# Patient Record
Sex: MALE | Race: WHITE | Employment: FULL TIME | URBAN - METROPOLITAN AREA
[De-identification: names, ages, dates, MRNs, and addresses within clinical notes are randomized per-mention and may not be internally consistent; named-entity substitution may affect disease eponyms.]

---

## 2019-05-01 ENCOUNTER — APPOINTMENT (OUTPATIENT)
Dept: CT IMAGING | Age: 59
End: 2019-05-01
Payer: COMMERCIAL

## 2019-05-01 ENCOUNTER — HOSPITAL ENCOUNTER (OUTPATIENT)
Age: 59
Setting detail: OBSERVATION
Discharge: HOME | End: 2019-05-03
Attending: EMERGENCY MEDICINE | Admitting: INTERNAL MEDICINE
Payer: COMMERCIAL

## 2019-05-01 DIAGNOSIS — K85.90 ACUTE HEMORRHAGIC PANCREATITIS: Primary | ICD-10-CM

## 2019-05-01 PROBLEM — K85.00 IDIOPATHIC ACUTE PANCREATITIS: Status: ACTIVE | Noted: 2019-05-01

## 2019-05-01 LAB
ALBUMIN SERPL-MCNC: 4.1 G/DL (ref 3.5–5.2)
ALP BLD-CCNC: 44 U/L (ref 40–129)
ALT SERPL-CCNC: 21 U/L (ref 0–40)
ANION GAP SERPL CALCULATED.3IONS-SCNC: 13 MMOL/L (ref 7–16)
AST SERPL-CCNC: 17 U/L (ref 0–39)
BASOPHILS ABSOLUTE: 0.07 E9/L (ref 0–0.2)
BASOPHILS RELATIVE PERCENT: 0.5 % (ref 0–2)
BILIRUB SERPL-MCNC: 0.4 MG/DL (ref 0–1.2)
BUN BLDV-MCNC: 13 MG/DL (ref 6–20)
CALCIUM SERPL-MCNC: 9.2 MG/DL (ref 8.6–10.2)
CHLORIDE BLD-SCNC: 103 MMOL/L (ref 98–107)
CO2: 25 MMOL/L (ref 22–29)
CREAT SERPL-MCNC: 1.1 MG/DL (ref 0.7–1.2)
EOSINOPHILS ABSOLUTE: 0.02 E9/L (ref 0.05–0.5)
EOSINOPHILS RELATIVE PERCENT: 0.1 % (ref 0–6)
GFR AFRICAN AMERICAN: >60
GFR NON-AFRICAN AMERICAN: >60 ML/MIN/1.73
GLUCOSE BLD-MCNC: 136 MG/DL (ref 74–99)
HCT VFR BLD CALC: 50.1 % (ref 37–54)
HEMOGLOBIN: 17.1 G/DL (ref 12.5–16.5)
IMMATURE GRANULOCYTES #: 0.07 E9/L
IMMATURE GRANULOCYTES %: 0.5 % (ref 0–5)
LACTIC ACID: 2 MMOL/L (ref 0.5–2.2)
LIPASE: 20 U/L (ref 13–60)
LYMPHOCYTES ABSOLUTE: 1.3 E9/L (ref 1.5–4)
LYMPHOCYTES RELATIVE PERCENT: 9.6 % (ref 20–42)
MCH RBC QN AUTO: 31.1 PG (ref 26–35)
MCHC RBC AUTO-ENTMCNC: 34.1 % (ref 32–34.5)
MCV RBC AUTO: 91.3 FL (ref 80–99.9)
MONOCYTES ABSOLUTE: 0.74 E9/L (ref 0.1–0.95)
MONOCYTES RELATIVE PERCENT: 5.4 % (ref 2–12)
NEUTROPHILS ABSOLUTE: 11.41 E9/L (ref 1.8–7.3)
NEUTROPHILS RELATIVE PERCENT: 83.9 % (ref 43–80)
PDW BLD-RTO: 13.2 FL (ref 11.5–15)
PLATELET # BLD: 172 E9/L (ref 130–450)
PMV BLD AUTO: 10.6 FL (ref 7–12)
POTASSIUM SERPL-SCNC: 4.4 MMOL/L (ref 3.5–5)
RBC # BLD: 5.49 E12/L (ref 3.8–5.8)
SODIUM BLD-SCNC: 141 MMOL/L (ref 132–146)
TOTAL PROTEIN: 6.4 G/DL (ref 6.4–8.3)
WBC # BLD: 13.6 E9/L (ref 4.5–11.5)

## 2019-05-01 PROCEDURE — 36415 COLL VENOUS BLD VENIPUNCTURE: CPT

## 2019-05-01 PROCEDURE — 96376 TX/PRO/DX INJ SAME DRUG ADON: CPT

## 2019-05-01 PROCEDURE — 96361 HYDRATE IV INFUSION ADD-ON: CPT

## 2019-05-01 PROCEDURE — 85018 HEMOGLOBIN: CPT

## 2019-05-01 PROCEDURE — G0378 HOSPITAL OBSERVATION PER HR: HCPCS

## 2019-05-01 PROCEDURE — 74177 CT ABD & PELVIS W/CONTRAST: CPT

## 2019-05-01 PROCEDURE — 80053 COMPREHEN METABOLIC PANEL: CPT

## 2019-05-01 PROCEDURE — 2580000003 HC RX 258: Performed by: EMERGENCY MEDICINE

## 2019-05-01 PROCEDURE — 6360000002 HC RX W HCPCS: Performed by: EMERGENCY MEDICINE

## 2019-05-01 PROCEDURE — 2580000003 HC RX 258: Performed by: RADIOLOGY

## 2019-05-01 PROCEDURE — 85014 HEMATOCRIT: CPT

## 2019-05-01 PROCEDURE — 6360000004 HC RX CONTRAST MEDICATION: Performed by: RADIOLOGY

## 2019-05-01 PROCEDURE — 6360000002 HC RX W HCPCS: Performed by: INTERNAL MEDICINE

## 2019-05-01 PROCEDURE — C9113 INJ PANTOPRAZOLE SODIUM, VIA: HCPCS | Performed by: EMERGENCY MEDICINE

## 2019-05-01 PROCEDURE — 2580000003 HC RX 258: Performed by: INTERNAL MEDICINE

## 2019-05-01 PROCEDURE — 85025 COMPLETE CBC W/AUTO DIFF WBC: CPT

## 2019-05-01 PROCEDURE — 83690 ASSAY OF LIPASE: CPT

## 2019-05-01 PROCEDURE — 96374 THER/PROPH/DIAG INJ IV PUSH: CPT

## 2019-05-01 PROCEDURE — 96375 TX/PRO/DX INJ NEW DRUG ADDON: CPT

## 2019-05-01 PROCEDURE — 99285 EMERGENCY DEPT VISIT HI MDM: CPT

## 2019-05-01 PROCEDURE — 83605 ASSAY OF LACTIC ACID: CPT

## 2019-05-01 RX ORDER — SODIUM CHLORIDE 9 MG/ML
INJECTION, SOLUTION INTRAVENOUS CONTINUOUS
Status: DISCONTINUED | OUTPATIENT
Start: 2019-05-01 | End: 2019-05-02

## 2019-05-01 RX ORDER — MORPHINE SULFATE 2 MG/ML
2 INJECTION, SOLUTION INTRAMUSCULAR; INTRAVENOUS
Status: DISCONTINUED | OUTPATIENT
Start: 2019-05-01 | End: 2019-05-03 | Stop reason: HOSPADM

## 2019-05-01 RX ORDER — PANTOPRAZOLE SODIUM 40 MG/10ML
40 INJECTION, POWDER, LYOPHILIZED, FOR SOLUTION INTRAVENOUS ONCE
Status: COMPLETED | OUTPATIENT
Start: 2019-05-01 | End: 2019-05-01

## 2019-05-01 RX ORDER — SODIUM CHLORIDE 0.9 % (FLUSH) 0.9 %
10 SYRINGE (ML) INJECTION PRN
Status: COMPLETED | OUTPATIENT
Start: 2019-05-01 | End: 2019-05-01

## 2019-05-01 RX ORDER — 0.9 % SODIUM CHLORIDE 0.9 %
1000 INTRAVENOUS SOLUTION INTRAVENOUS ONCE
Status: COMPLETED | OUTPATIENT
Start: 2019-05-01 | End: 2019-05-01

## 2019-05-01 RX ORDER — POTASSIUM CHLORIDE 20 MEQ/1
40 TABLET, EXTENDED RELEASE ORAL PRN
Status: DISCONTINUED | OUTPATIENT
Start: 2019-05-01 | End: 2019-05-03 | Stop reason: HOSPADM

## 2019-05-01 RX ORDER — 0.9 % SODIUM CHLORIDE 0.9 %
1000 INTRAVENOUS SOLUTION INTRAVENOUS ONCE
Status: DISCONTINUED | OUTPATIENT
Start: 2019-05-01 | End: 2019-05-01

## 2019-05-01 RX ORDER — ONDANSETRON 2 MG/ML
4 INJECTION INTRAMUSCULAR; INTRAVENOUS EVERY 6 HOURS PRN
Status: DISCONTINUED | OUTPATIENT
Start: 2019-05-01 | End: 2019-05-03 | Stop reason: HOSPADM

## 2019-05-01 RX ORDER — SODIUM CHLORIDE 0.9 % (FLUSH) 0.9 %
10 SYRINGE (ML) INJECTION EVERY 12 HOURS SCHEDULED
Status: DISCONTINUED | OUTPATIENT
Start: 2019-05-01 | End: 2019-05-02 | Stop reason: SDUPTHER

## 2019-05-01 RX ORDER — SODIUM CHLORIDE 0.9 % (FLUSH) 0.9 %
10 SYRINGE (ML) INJECTION PRN
Status: DISCONTINUED | OUTPATIENT
Start: 2019-05-01 | End: 2019-05-03 | Stop reason: HOSPADM

## 2019-05-01 RX ORDER — ONDANSETRON 2 MG/ML
8 INJECTION INTRAMUSCULAR; INTRAVENOUS ONCE
Status: COMPLETED | OUTPATIENT
Start: 2019-05-01 | End: 2019-05-01

## 2019-05-01 RX ORDER — FENTANYL CITRATE 50 UG/ML
50 INJECTION, SOLUTION INTRAMUSCULAR; INTRAVENOUS ONCE
Status: COMPLETED | OUTPATIENT
Start: 2019-05-01 | End: 2019-05-01

## 2019-05-01 RX ORDER — POTASSIUM CHLORIDE 7.45 MG/ML
10 INJECTION INTRAVENOUS PRN
Status: DISCONTINUED | OUTPATIENT
Start: 2019-05-01 | End: 2019-05-03 | Stop reason: HOSPADM

## 2019-05-01 RX ADMIN — IOPAMIDOL 110 ML: 755 INJECTION, SOLUTION INTRAVENOUS at 18:07

## 2019-05-01 RX ADMIN — FENTANYL CITRATE 50 MCG: 50 INJECTION, SOLUTION INTRAMUSCULAR; INTRAVENOUS at 16:41

## 2019-05-01 RX ADMIN — SODIUM CHLORIDE 1000 ML: 9 INJECTION, SOLUTION INTRAVENOUS at 16:41

## 2019-05-01 RX ADMIN — ONDANSETRON 8 MG: 2 INJECTION INTRAMUSCULAR; INTRAVENOUS at 16:41

## 2019-05-01 RX ADMIN — Medication 10 ML: at 18:07

## 2019-05-01 RX ADMIN — SODIUM CHLORIDE 1000 ML: 9 INJECTION, SOLUTION INTRAVENOUS at 19:59

## 2019-05-01 RX ADMIN — Medication 10 ML: at 22:06

## 2019-05-01 RX ADMIN — MORPHINE SULFATE 2 MG: 2 INJECTION, SOLUTION INTRAMUSCULAR; INTRAVENOUS at 21:44

## 2019-05-01 RX ADMIN — FENTANYL CITRATE 50 MCG: 50 INJECTION, SOLUTION INTRAMUSCULAR; INTRAVENOUS at 18:25

## 2019-05-01 RX ADMIN — PANTOPRAZOLE SODIUM 40 MG: 40 INJECTION, POWDER, LYOPHILIZED, FOR SOLUTION INTRAVENOUS at 16:51

## 2019-05-01 RX ADMIN — SODIUM CHLORIDE: 9 INJECTION, SOLUTION INTRAVENOUS at 21:48

## 2019-05-01 ASSESSMENT — PAIN DESCRIPTION - ONSET
ONSET: ON-GOING
ONSET: ON-GOING

## 2019-05-01 ASSESSMENT — PAIN SCALES - GENERAL
PAINLEVEL_OUTOF10: 10
PAINLEVEL_OUTOF10: 7
PAINLEVEL_OUTOF10: 7
PAINLEVEL_OUTOF10: 0
PAINLEVEL_OUTOF10: 10
PAINLEVEL_OUTOF10: 9

## 2019-05-01 ASSESSMENT — PAIN - FUNCTIONAL ASSESSMENT
PAIN_FUNCTIONAL_ASSESSMENT: PREVENTS OR INTERFERES SOME ACTIVE ACTIVITIES AND ADLS
PAIN_FUNCTIONAL_ASSESSMENT: PREVENTS OR INTERFERES SOME ACTIVE ACTIVITIES AND ADLS

## 2019-05-01 ASSESSMENT — PAIN DESCRIPTION - FREQUENCY
FREQUENCY: CONTINUOUS
FREQUENCY: CONTINUOUS

## 2019-05-01 ASSESSMENT — PAIN DESCRIPTION - DESCRIPTORS
DESCRIPTORS: ACHING;CRAMPING;DISCOMFORT
DESCRIPTORS: ACHING;CRAMPING;DISCOMFORT

## 2019-05-01 ASSESSMENT — PAIN DESCRIPTION - PAIN TYPE
TYPE: ACUTE PAIN
TYPE: ACUTE PAIN

## 2019-05-01 ASSESSMENT — PAIN DESCRIPTION - LOCATION
LOCATION: ABDOMEN
LOCATION: ABDOMEN

## 2019-05-01 ASSESSMENT — PAIN DESCRIPTION - ORIENTATION
ORIENTATION: LOWER;MID
ORIENTATION: LOWER;MID

## 2019-05-01 ASSESSMENT — PAIN DESCRIPTION - PROGRESSION
CLINICAL_PROGRESSION: NOT CHANGED
CLINICAL_PROGRESSION: NOT CHANGED

## 2019-05-01 NOTE — ED PROVIDER NOTES
Immature Granulocytes % 0.5 0.0 - 5.0 %    Lymphocytes % 9.6 (L) 20.0 - 42.0 %    Monocytes % 5.4 2.0 - 12.0 %    Eosinophils % 0.1 0.0 - 6.0 %    Basophils % 0.5 0.0 - 2.0 %    Neutrophils # 11.41 (H) 1.80 - 7.30 E9/L    Immature Granulocytes # 0.07 E9/L    Lymphocytes # 1.30 (L) 1.50 - 4.00 E9/L    Monocytes # 0.74 0.10 - 0.95 E9/L    Eosinophils # 0.02 (L) 0.05 - 0.50 E9/L    Basophils # 0.07 0.00 - 0.20 E9/L   Comprehensive Metabolic Panel   Result Value Ref Range    Sodium 141 132 - 146 mmol/L    Potassium 4.4 3.5 - 5.0 mmol/L    Chloride 103 98 - 107 mmol/L    CO2 25 22 - 29 mmol/L    Anion Gap 13 7 - 16 mmol/L    Glucose 136 (H) 74 - 99 mg/dL    BUN 13 6 - 20 mg/dL    CREATININE 1.1 0.7 - 1.2 mg/dL    GFR Non-African American >60 >=60 mL/min/1.73    GFR African American >60     Calcium 9.2 8.6 - 10.2 mg/dL    Total Protein 6.4 6.4 - 8.3 g/dL    Alb 4.1 3.5 - 5.2 g/dL    Total Bilirubin 0.4 0.0 - 1.2 mg/dL    Alkaline Phosphatase 44 40 - 129 U/L    ALT 21 0 - 40 U/L    AST 17 0 - 39 U/L   Lactic Acid, Plasma   Result Value Ref Range    Lactic Acid 2.0 0.5 - 2.2 mmol/L   Lipase   Result Value Ref Range    Lipase 20 13 - 60 U/L   CBC auto differential   Result Value Ref Range    WBC 9.8 4.5 - 11.5 E9/L    RBC 4.30 3.80 - 5.80 E12/L    Hemoglobin 13.3 12.5 - 16.5 g/dL    Hematocrit 40.5 37.0 - 54.0 %    MCV 94.2 80.0 - 99.9 fL    MCH 30.9 26.0 - 35.0 pg    MCHC 32.8 32.0 - 34.5 %    RDW 13.6 11.5 - 15.0 fL    Platelets 018 007 - 324 E9/L    MPV 10.9 7.0 - 12.0 fL    Neutrophils % 70.8 43.0 - 80.0 %    Immature Granulocytes % 0.4 0.0 - 5.0 %    Lymphocytes % 19.9 (L) 20.0 - 42.0 %    Monocytes % 7.9 2.0 - 12.0 %    Eosinophils % 0.5 0.0 - 6.0 %    Basophils % 0.5 0.0 - 2.0 %    Neutrophils # 6.94 1.80 - 7.30 E9/L    Immature Granulocytes # 0.04 E9/L    Lymphocytes # 1.95 1.50 - 4.00 E9/L    Monocytes # 0.77 0.10 - 0.95 E9/L    Eosinophils # 0.05 0.05 - 0.50 E9/L    Basophils # 0.05 0.00 - 0.20 E9/L   Comprehensive Metabolic Panel w/ Reflex to MG   Result Value Ref Range    Sodium 141 132 - 146 mmol/L    Potassium reflex Magnesium 4.0 3.5 - 5.0 mmol/L    Chloride 108 (H) 98 - 107 mmol/L    CO2 24 22 - 29 mmol/L    Anion Gap 9 7 - 16 mmol/L    Glucose 87 74 - 99 mg/dL    BUN 13 6 - 20 mg/dL    CREATININE 0.9 0.7 - 1.2 mg/dL    GFR Non-African American >60 >=60 mL/min/1.73    GFR African American >60     Calcium 7.6 (L) 8.6 - 10.2 mg/dL    Total Protein 5.0 (L) 6.4 - 8.3 g/dL    Alb 3.2 (L) 3.5 - 5.2 g/dL    Total Bilirubin 0.4 0.0 - 1.2 mg/dL    Alkaline Phosphatase 35 (L) 40 - 129 U/L    ALT 15 0 - 40 U/L    AST 12 0 - 39 U/L   Hemoglobin and hematocrit, blood   Result Value Ref Range    Hemoglobin 13.7 12.5 - 16.5 g/dL    Hematocrit 40.9 37.0 - 54.0 %   CBC   Result Value Ref Range    WBC 10.2 4.5 - 11.5 E9/L    RBC 4.71 3.80 - 5.80 E12/L    Hemoglobin 14.6 12.5 - 16.5 g/dL    Hematocrit 44.7 37.0 - 54.0 %    MCV 94.9 80.0 - 99.9 fL    MCH 31.0 26.0 - 35.0 pg    MCHC 32.7 32.0 - 34.5 %    RDW 13.7 11.5 - 15.0 fL    Platelets 955 099 - 695 E9/L    MPV 10.2 7.0 - 12.0 fL   CBC   Result Value Ref Range    WBC 8.5 4.5 - 11.5 E9/L    RBC 4.22 3.80 - 5.80 E12/L    Hemoglobin 13.1 12.5 - 16.5 g/dL    Hematocrit 39.5 37.0 - 54.0 %    MCV 93.6 80.0 - 99.9 fL    MCH 31.0 26.0 - 35.0 pg    MCHC 33.2 32.0 - 34.5 %    RDW 13.6 11.5 - 15.0 fL    Platelets 330 923 - 321 E9/L    MPV 10.3 7.0 - 12.0 fL   CBC   Result Value Ref Range    WBC 7.5 4.5 - 11.5 E9/L    RBC 3.80 3.80 - 5.80 E12/L    Hemoglobin 12.0 (L) 12.5 - 16.5 g/dL    Hematocrit 35.6 (L) 37.0 - 54.0 %    MCV 93.7 80.0 - 99.9 fL    MCH 31.6 26.0 - 35.0 pg    MCHC 33.7 32.0 - 34.5 %    RDW 13.6 11.5 - 15.0 fL    Platelets 551 (L) 956 - 450 E9/L    MPV 10.3 7.0 - 12.0 fL   CBC   Result Value Ref Range    WBC 8.2 4.5 - 11.5 E9/L    RBC 4.22 3.80 - 5.80 E12/L    Hemoglobin 12.9 12.5 - 16.5 g/dL    Hematocrit 39.0 37.0 - 54.0 %    MCV 92.4 80.0 - 99.9 fL    MCH 30.6 26.0 - 35.0 pg MCHC 33.1 32.0 - 34.5 %    RDW 13.4 11.5 - 15.0 fL    Platelets 441 313 - 516 E9/L    MPV 10.8 7.0 - 12.0 fL   Comprehensive Metabolic Panel   Result Value Ref Range    Sodium 134 132 - 146 mmol/L    Potassium 3.9 3.5 - 5.0 mmol/L    Chloride 101 98 - 107 mmol/L    CO2 24 22 - 29 mmol/L    Anion Gap 9 7 - 16 mmol/L    Glucose 109 (H) 74 - 99 mg/dL    BUN 14 6 - 20 mg/dL    CREATININE 0.9 0.7 - 1.2 mg/dL    GFR Non-African American >60 >=60 mL/min/1.73    GFR African American >60     Calcium 8.1 (L) 8.6 - 10.2 mg/dL    Total Protein 5.6 (L) 6.4 - 8.3 g/dL    Alb 3.4 (L) 3.5 - 5.2 g/dL    Total Bilirubin 0.4 0.0 - 1.2 mg/dL    Alkaline Phosphatase 39 (L) 40 - 129 U/L    ALT 12 0 - 40 U/L    AST 11 0 - 39 U/L   Lipid panel   Result Value Ref Range    Cholesterol, Total 111 0 - 199 mg/dL    Triglycerides 116 0 - 149 mg/dL    HDL 31 >40 mg/dL    LDL Calculated 57 0 - 99 mg/dL    VLDL Cholesterol Calculated 23 mg/dL       RADIOLOGY:  Interpreted by Radiologist.  CTA ABDOMEN PELVIS W CONTRAST   Final Result   Considerable amount of increased density in the peripancreatic and   perigastric region and lesser sac compatible with hemorrhagic   pancreatitis. Similar to the prior study. There is a moderate amount   of abdominal and pelvic ascites      No pseudoaneurysm or focal area of extravasation from mesenteric   vessels are noted. CT ABDOMEN PELVIS W IV CONTRAST Additional Contrast? None   Final Result   Significant inflammatory changes along the pancreas predominantly the   body and tail with a high density fluid surrounding the pancreas and   left upper quadrant extending to the mesentery and the pelvis   concerning for acute pancreatitis with  possibly hemorrhagic ascites. Correlation with the pancreatic enzymes and surveillance recommended.       ALERT:  THIS IS AN ABNORMAL REPORT                  ------------------------- NURSING NOTES AND VITALS REVIEWED ---------------------------   The nursing notes within the ED encounter and vital signs as below have been reviewed. /75   Pulse 75   Temp 98.4 °F (36.9 °C) (Oral)   Resp 16   Ht 5' 11\" (1.803 m)   Wt 184 lb 9.6 oz (83.7 kg)   SpO2 99%   BMI 25.75 kg/m²   Oxygen Saturation Interpretation: Normal      ---------------------------------------------------PHYSICAL EXAM--------------------------------------    Constitutional/General: Alert and oriented x3, uncomfortable appearing, non toxic in NAD  Head: Normocephalic and atraumatic  Eyes: PERRL, EOMI, conjunctiva normal, sclera non icteric  Mouth: Oropharynx clear, handling secretions, no trismus  Neck: Supple, full ROM, no stridor, no crepitus, no meningeal signs  Respiratory: Lungs clear to auscultation bilaterally, no wheezes, rales, or rhonchi. Not in respiratory distress  Cardiovascular:  Regular rate. Regular rhythm. No murmurs, gallops, or rubs. 2+ distal pulses  Chest: No chest wall tenderness  GI:  Abdomen Soft, epigastric tenderness, Non distended. +BS. No organomegaly, no palpable masses,  No rebound, guarding, or rigidity. Musculoskeletal: Moves all extremities x 4. Warm and well perfused, no clubbing, cyanosis, or edema. Capillary refill <3 seconds  Integument: skin warm and dry. No rashes.    Lymphatic: no lymphadenopathy noted  Neurologic: GCS 15, no focal deficits, symmetric strength 5/5 in the upper and lower extremities bilaterally  Psychiatric: Normal Affect      ------------------------------ ED COURSE/MEDICAL DECISION MAKING----------------------  Medications   sodium chloride flush 0.9 % injection 10 mL (has no administration in time range)   potassium chloride (KLOR-CON M) extended release tablet 40 mEq (has no administration in time range)     Or   potassium bicarb-citric acid (EFFER-K) effervescent tablet 40 mEq (has no administration in time range)     Or   potassium chloride 10 mEq/100 mL IVPB (Peripheral Line) (has no administration in time range)   magnesium hydroxide (MILK OF MAGNESIA) 400 MG/5ML suspension 30 mL (has no administration in time range)   ondansetron (ZOFRAN) injection 4 mg (has no administration in time range)   morphine (PF) injection 2 mg (2 mg Intravenous Given 5/2/19 0635)   pantoprazole (PROTONIX) injection 40 mg (40 mg Intravenous Not Given 5/3/19 0841)     And   sodium chloride (PF) 0.9 % injection 10 mL (10 mLs Intravenous Not Given 5/3/19 0841)   sodium chloride flush 0.9 % injection 10 mL (10 mLs Intravenous Not Given 5/3/19 0841)   0.9 % sodium chloride bolus (0 mLs Intravenous Stopped 5/1/19 1825)   ondansetron (ZOFRAN) injection 8 mg (8 mg Intravenous Given 5/1/19 1641)   fentaNYL (SUBLIMAZE) injection 50 mcg (50 mcg Intravenous Given 5/1/19 1641)   pantoprazole (PROTONIX) injection 40 mg (40 mg Intravenous Given 5/1/19 1651)   iopamidol (ISOVUE-370) 76 % injection 110 mL (110 mLs Intravenous Given 5/1/19 1807)   sodium chloride flush 0.9 % injection 10 mL (10 mLs Intravenous Given 5/1/19 1807)   fentaNYL (SUBLIMAZE) injection 50 mcg (50 mcg Intravenous Given 5/1/19 1825)   0.9 % sodium chloride bolus (0 mLs Intravenous Stopped 5/1/19 2206)   iopamidol (ISOVUE-370) 76 % injection 100 mL (100 mLs Intravenous Given 5/2/19 1130)       ED Course as of May 03 1403   Wed May 01, 2019   1907 Updated patient and family on results and plan. [MF]   Nallely Mercado, he will admit patient. [MF]   1921 Discussed case with Dr. Simba Still, general surgery, he will consult    [MF]      ED Course User Index  [MF] Mir Osullivan DO          Medical Decision Making:    Patient presents for acute epigastric pain radiating to back,labs unremarkable but CT concerning for hemorrhagic pancreatitis. Patient admitted for further evaluation and treatment. Counseling: The emergency provider has spoken with the patient and discussed todays results, in addition to providing specific details for the plan of care and counseling regarding the diagnosis and prognosis. Questions are answered at this time and they are agreeable with the plan.      --------------------------------- IMPRESSION AND DISPOSITION ---------------------------------    IMPRESSION  1. Acute hemorrhagic pancreatitis        DISPOSITION  Disposition: Admit to telemetry  Patient condition is stable      NOTE: This report was transcribed using voice recognition software.  Every effort was made to ensure accuracy; however, inadvertent computerized transcription errors may be present         Para DO Alfreda  Resident  05/03/19 [id number]

## 2019-05-01 NOTE — ED NOTES
Bed: 28  Expected date:   Expected time:   Means of arrival:   Comments:  ems     Milton Donahue RN  05/01/19 [id number]

## 2019-05-02 ENCOUNTER — APPOINTMENT (OUTPATIENT)
Dept: CT IMAGING | Age: 59
End: 2019-05-02
Payer: COMMERCIAL

## 2019-05-02 LAB
ALBUMIN SERPL-MCNC: 3.2 G/DL (ref 3.5–5.2)
ALP BLD-CCNC: 35 U/L (ref 40–129)
ALT SERPL-CCNC: 15 U/L (ref 0–40)
ANION GAP SERPL CALCULATED.3IONS-SCNC: 9 MMOL/L (ref 7–16)
AST SERPL-CCNC: 12 U/L (ref 0–39)
BASOPHILS ABSOLUTE: 0.05 E9/L (ref 0–0.2)
BASOPHILS RELATIVE PERCENT: 0.5 % (ref 0–2)
BILIRUB SERPL-MCNC: 0.4 MG/DL (ref 0–1.2)
BUN BLDV-MCNC: 13 MG/DL (ref 6–20)
CALCIUM SERPL-MCNC: 7.6 MG/DL (ref 8.6–10.2)
CHLORIDE BLD-SCNC: 108 MMOL/L (ref 98–107)
CO2: 24 MMOL/L (ref 22–29)
CREAT SERPL-MCNC: 0.9 MG/DL (ref 0.7–1.2)
EOSINOPHILS ABSOLUTE: 0.05 E9/L (ref 0.05–0.5)
EOSINOPHILS RELATIVE PERCENT: 0.5 % (ref 0–6)
GFR AFRICAN AMERICAN: >60
GFR NON-AFRICAN AMERICAN: >60 ML/MIN/1.73
GLUCOSE BLD-MCNC: 87 MG/DL (ref 74–99)
HCT VFR BLD CALC: 39.5 % (ref 37–54)
HCT VFR BLD CALC: 40.5 % (ref 37–54)
HCT VFR BLD CALC: 40.9 % (ref 37–54)
HCT VFR BLD CALC: 44.7 % (ref 37–54)
HEMOGLOBIN: 13.1 G/DL (ref 12.5–16.5)
HEMOGLOBIN: 13.3 G/DL (ref 12.5–16.5)
HEMOGLOBIN: 13.7 G/DL (ref 12.5–16.5)
HEMOGLOBIN: 14.6 G/DL (ref 12.5–16.5)
IMMATURE GRANULOCYTES #: 0.04 E9/L
IMMATURE GRANULOCYTES %: 0.4 % (ref 0–5)
LYMPHOCYTES ABSOLUTE: 1.95 E9/L (ref 1.5–4)
LYMPHOCYTES RELATIVE PERCENT: 19.9 % (ref 20–42)
MCH RBC QN AUTO: 30.9 PG (ref 26–35)
MCH RBC QN AUTO: 31 PG (ref 26–35)
MCH RBC QN AUTO: 31 PG (ref 26–35)
MCHC RBC AUTO-ENTMCNC: 32.7 % (ref 32–34.5)
MCHC RBC AUTO-ENTMCNC: 32.8 % (ref 32–34.5)
MCHC RBC AUTO-ENTMCNC: 33.2 % (ref 32–34.5)
MCV RBC AUTO: 93.6 FL (ref 80–99.9)
MCV RBC AUTO: 94.2 FL (ref 80–99.9)
MCV RBC AUTO: 94.9 FL (ref 80–99.9)
MONOCYTES ABSOLUTE: 0.77 E9/L (ref 0.1–0.95)
MONOCYTES RELATIVE PERCENT: 7.9 % (ref 2–12)
NEUTROPHILS ABSOLUTE: 6.94 E9/L (ref 1.8–7.3)
NEUTROPHILS RELATIVE PERCENT: 70.8 % (ref 43–80)
PDW BLD-RTO: 13.6 FL (ref 11.5–15)
PDW BLD-RTO: 13.6 FL (ref 11.5–15)
PDW BLD-RTO: 13.7 FL (ref 11.5–15)
PLATELET # BLD: 157 E9/L (ref 130–450)
PLATELET # BLD: 160 E9/L (ref 130–450)
PLATELET # BLD: 165 E9/L (ref 130–450)
PMV BLD AUTO: 10.2 FL (ref 7–12)
PMV BLD AUTO: 10.3 FL (ref 7–12)
PMV BLD AUTO: 10.9 FL (ref 7–12)
POTASSIUM REFLEX MAGNESIUM: 4 MMOL/L (ref 3.5–5)
RBC # BLD: 4.22 E12/L (ref 3.8–5.8)
RBC # BLD: 4.3 E12/L (ref 3.8–5.8)
RBC # BLD: 4.71 E12/L (ref 3.8–5.8)
SODIUM BLD-SCNC: 141 MMOL/L (ref 132–146)
TOTAL PROTEIN: 5 G/DL (ref 6.4–8.3)
WBC # BLD: 10.2 E9/L (ref 4.5–11.5)
WBC # BLD: 8.5 E9/L (ref 4.5–11.5)
WBC # BLD: 9.8 E9/L (ref 4.5–11.5)

## 2019-05-02 PROCEDURE — 85025 COMPLETE CBC W/AUTO DIFF WBC: CPT

## 2019-05-02 PROCEDURE — 6360000004 HC RX CONTRAST MEDICATION: Performed by: RADIOLOGY

## 2019-05-02 PROCEDURE — C9113 INJ PANTOPRAZOLE SODIUM, VIA: HCPCS | Performed by: STUDENT IN AN ORGANIZED HEALTH CARE EDUCATION/TRAINING PROGRAM

## 2019-05-02 PROCEDURE — G0378 HOSPITAL OBSERVATION PER HR: HCPCS

## 2019-05-02 PROCEDURE — 2580000003 HC RX 258: Performed by: RADIOLOGY

## 2019-05-02 PROCEDURE — 6360000002 HC RX W HCPCS: Performed by: STUDENT IN AN ORGANIZED HEALTH CARE EDUCATION/TRAINING PROGRAM

## 2019-05-02 PROCEDURE — 96361 HYDRATE IV INFUSION ADD-ON: CPT

## 2019-05-02 PROCEDURE — 85027 COMPLETE CBC AUTOMATED: CPT

## 2019-05-02 PROCEDURE — 2580000003 HC RX 258: Performed by: STUDENT IN AN ORGANIZED HEALTH CARE EDUCATION/TRAINING PROGRAM

## 2019-05-02 PROCEDURE — 96376 TX/PRO/DX INJ SAME DRUG ADON: CPT

## 2019-05-02 PROCEDURE — 6360000002 HC RX W HCPCS: Performed by: INTERNAL MEDICINE

## 2019-05-02 PROCEDURE — 36415 COLL VENOUS BLD VENIPUNCTURE: CPT

## 2019-05-02 PROCEDURE — 80053 COMPREHEN METABOLIC PANEL: CPT

## 2019-05-02 PROCEDURE — 74174 CTA ABD&PLVS W/CONTRAST: CPT

## 2019-05-02 RX ORDER — 0.9 % SODIUM CHLORIDE 0.9 %
10 VIAL (ML) INJECTION DAILY
Status: DISCONTINUED | OUTPATIENT
Start: 2019-05-02 | End: 2019-05-03 | Stop reason: HOSPADM

## 2019-05-02 RX ORDER — PANTOPRAZOLE SODIUM 40 MG/10ML
40 INJECTION, POWDER, LYOPHILIZED, FOR SOLUTION INTRAVENOUS DAILY
Status: DISCONTINUED | OUTPATIENT
Start: 2019-05-02 | End: 2019-05-03 | Stop reason: HOSPADM

## 2019-05-02 RX ORDER — SODIUM CHLORIDE, SODIUM LACTATE, POTASSIUM CHLORIDE, CALCIUM CHLORIDE 600; 310; 30; 20 MG/100ML; MG/100ML; MG/100ML; MG/100ML
INJECTION, SOLUTION INTRAVENOUS CONTINUOUS
Status: DISCONTINUED | OUTPATIENT
Start: 2019-05-02 | End: 2019-05-02

## 2019-05-02 RX ORDER — SODIUM CHLORIDE 0.9 % (FLUSH) 0.9 %
10 SYRINGE (ML) INJECTION 2 TIMES DAILY
Status: DISCONTINUED | OUTPATIENT
Start: 2019-05-02 | End: 2019-05-03 | Stop reason: HOSPADM

## 2019-05-02 RX ADMIN — MORPHINE SULFATE 2 MG: 2 INJECTION, SOLUTION INTRAMUSCULAR; INTRAVENOUS at 00:55

## 2019-05-02 RX ADMIN — IOPAMIDOL 100 ML: 755 INJECTION, SOLUTION INTRAVENOUS at 11:30

## 2019-05-02 RX ADMIN — SODIUM CHLORIDE 10 ML: 9 INJECTION, SOLUTION INTRAMUSCULAR; INTRAVENOUS; SUBCUTANEOUS at 07:48

## 2019-05-02 RX ADMIN — MORPHINE SULFATE 2 MG: 2 INJECTION, SOLUTION INTRAMUSCULAR; INTRAVENOUS at 06:35

## 2019-05-02 RX ADMIN — Medication 10 ML: at 20:38

## 2019-05-02 RX ADMIN — PANTOPRAZOLE SODIUM 40 MG: 40 INJECTION, POWDER, LYOPHILIZED, FOR SOLUTION INTRAVENOUS at 07:49

## 2019-05-02 RX ADMIN — SODIUM CHLORIDE, POTASSIUM CHLORIDE, SODIUM LACTATE AND CALCIUM CHLORIDE: 600; 310; 30; 20 INJECTION, SOLUTION INTRAVENOUS at 07:49

## 2019-05-02 ASSESSMENT — PAIN DESCRIPTION - DESCRIPTORS
DESCRIPTORS: ACHING;CRAMPING;DISCOMFORT
DESCRIPTORS: ACHING;CRAMPING;DISCOMFORT

## 2019-05-02 ASSESSMENT — PAIN DESCRIPTION - ORIENTATION
ORIENTATION: LOWER;MID
ORIENTATION: LOWER;MID

## 2019-05-02 ASSESSMENT — PAIN DESCRIPTION - ONSET
ONSET: ON-GOING
ONSET: ON-GOING

## 2019-05-02 ASSESSMENT — PAIN DESCRIPTION - PROGRESSION
CLINICAL_PROGRESSION: GRADUALLY IMPROVING
CLINICAL_PROGRESSION: GRADUALLY IMPROVING

## 2019-05-02 ASSESSMENT — PAIN DESCRIPTION - FREQUENCY
FREQUENCY: INTERMITTENT
FREQUENCY: INTERMITTENT

## 2019-05-02 ASSESSMENT — PAIN SCALES - GENERAL
PAINLEVEL_OUTOF10: 0
PAINLEVEL_OUTOF10: 0
PAINLEVEL_OUTOF10: 7
PAINLEVEL_OUTOF10: 0
PAINLEVEL_OUTOF10: 0
PAINLEVEL_OUTOF10: 7

## 2019-05-02 ASSESSMENT — PAIN DESCRIPTION - LOCATION
LOCATION: ABDOMEN
LOCATION: ABDOMEN

## 2019-05-02 ASSESSMENT — PAIN DESCRIPTION - PAIN TYPE
TYPE: ACUTE PAIN
TYPE: ACUTE PAIN

## 2019-05-02 NOTE — H&P
History and Physical      CHIEF COMPLAINT:  abdominal pain       HISTORY OF PRESENT ILLNESS:      The patient is a 64 y.o. male patient of JAMES Patel who presents with abdominal pain from work. He was well until yesterday when he was working lifting a relatively heavy object and developed sudden onset of severe pain in his mid to lower abdomen that brought him to his knees. He went home and had a bowel movement that was normal with no worsening or improvement in pain. He called 911. He denies any fever chills vomiting diarrhea hematemesis hematochezia or melena. Did have some nausea which he attributed to the severity of the pain. He has no prior history of GI disorders specifically no history of pancreatitis. He does not use nonsteroidals. He drinks a glass of wine weekly. He states he has a normal cholesterol. He does not have any history of hypertension or diabetes. No family history of inflammatory bowel disease. Past Medical History:    History reviewed. No pertinent past medical history. Past Surgical History:    History reviewed. No pertinent surgical history. Medications Prior to Admission:    No medications prior to admission. Allergies:    Patient has no known allergies. Social History:    reports that he has been smoking cigarettes. He has a 20.00 pack-year smoking history. He does not have any smokeless tobacco history on file. He reports that he drinks alcohol. He reports that he does not use drugs.     Family History:     Brother  of leukemia    REVIEW OF SYSTEMS    Constitutional: negative for chills, fatigue, fevers, malaise, sweats and weight loss  Eyes: negative for icterus, irritation and redness  Ears, nose, mouth, throat, and face: negative for epistaxis, facial trauma, hearing loss, hoarseness, nasal congestion, snoring, sore mouth and sore throat  Respiratory: negative for cough, dyspnea on exertion, emphysema and hemoptysis  Cardiovascular: negative for chest pain, chest pressure/discomfort, claudication, dyspnea, exertional chest pressure/discomfort, fatigue, irregular heart beat, lower extremity edema, near-syncope, orthopnea and palpitations  Gastrointestinal: positive for abdominal pain, negative for vomiting  Genitourinary:negative for dysuria and frequency  Integument/breast: negative for pruritus and rash  Hematologic/lymphatic: negative for bleeding and easy bruising  Musculoskeletal:negative for arthralgias and back pain  Neurological: negative for coordination problems and dizziness  Behavioral/Psych: negative for decreased appetite and depression  Endocrine: negative for temperature intolerance  Allergic/Immunologic: negative for anaphylaxis and angioedema    PHYSICAL EXAM:    Vitals:  /70   Pulse 67   Temp 98.8 °F (37.1 °C) (Oral)   Resp 16   Ht 5' 11\" (1.803 m)   Wt 186 lb 4.8 oz (84.5 kg)   SpO2 96%   BMI 25.98 kg/m²     General appearance: alert, appears stated age and cooperative  Head: Normocephalic, without obvious abnormality, atraumatic  Eyes: conjunctivae/corneas clear. PERRL, EOM's intact. Fundi benign. Ears: normal TM's and external ear canals both ears  Nose: Nares normal. Septum midline. Mucosa normal. No drainage or sinus tenderness.   Throat: lips, mucosa, and tongue normal; teeth and gums normal  Neck: no adenopathy, no carotid bruit, no JVD, supple, symmetrical, trachea midline and thyroid not enlarged, symmetric, no tenderness/mass/nodules  Lungs: clear to auscultation bilaterally  Heart: regular rate and rhythm, S1, S2 normal, no murmur, click, rub or gallop  Abdomen: normal findings: no masses palpable and no organomegaly and abnormal findings:  tenderness mild in the epigastrium  Extremities: extremities normal, atraumatic, no cyanosis or edema  Pulses: 2+ and symmetric  Skin: Skin color, texture, turgor normal. No rashes or lesions  Neurologic: Grossly normal    Results      Component Value Units   Comprehensive Metabolic Panel w/ Reflex to MG [[id number]] (Abnormal) Collected: 19   Updated: 19 0558    Specimen Source: Blood     Sodium 141 mmol/L    Potassium reflex Magnesium 4.0 mmol/L    Chloride 108High  mmol/L    CO2 24 mmol/L    Anion Gap 9 mmol/L    Glucose 87 mg/dL    BUN 13 mg/dL    CREATININE 0.9 mg/dL    GFR Non-African American >60 mL/min/1.73    Comment: Chronic Kidney Disease: less than 60 ml/min/1.73 sq. m.         Kidney Failure: less than 15 ml/min/1.73 sq.m. Results valid for patients 18 years and older. GFR  >60    Calcium 7.6Low  mg/dL    Total Protein 5.0Low  g/dL    Alb 3.2Low  g/dL    Total Bilirubin 0.4 mg/dL    Alkaline Phosphatase 35Low  U/L    ALT 15 U/L    AST 12 U/L   CBC auto differential [[id number]] (Abnormal) Collected: 19   Updated: 19    Specimen Source: Blood     WBC 9.8 E9/L    RBC 4.30 E12/L    Hemoglobin 13.3 g/dL    Hematocrit 40.5 %    MCV 94.2 fL    MCH 30.9 pg    MCHC 32.8 %    RDW 13.6 fL    Platelets 162 H7/E    MPV 10.9 fL    Neutrophils % 70.8 %    Immature Granulocytes % 0.4 %    Lymphocytes % 19.9Low  %    Monocytes % 7.9 %    Eosinophils % 0.5 %    Basophils % 0.5 %    Neutrophils # 6.94 E9/L    Immature Granulocytes # 0.04 E9/L    Lymphocytes # 1.95 E9/L    Monocytes # 0.77 E9/L    Eosinophils # 0.05 E9/L    Basophils # 0.05 E9/L   Hemoglobin and hematocrit, blood [[id number]] Collected: 196   Updated: 19 0008    Specimen Source: Blood     Hemoglobin 13.7 g/dL    Hematocrit 40.9 %   CT ABDOMEN PELVIS W IV CONTRAST Additional Contrast? None [[id number]] Resulted: 19   Updated: 19    Narrative:     Patient MRN:  [id number]  : 1960  Age: 64 years  Gender: Male    Order Date:  2019 5:00 PM    EXAM: CT ABDOMEN PELVIS W IV CONTRAST number of images 392.     Contrast. Isovue-370, 110 mL intravenously    Technique: Low-dose CT  acquisition technique included one of  following options; 1 . Automated exposure control, 2. Adjustment of MA  and or KV according to patient's size or 3. Use of iterative  reconstruction. INDICATION:  abdominal pain      COMPARISON: None    FINDINGS:  The lung bases demonstrate no significant abnormalities. The liver is  of normal architecture except for a 1.4 cm cystic lesion in the left  hepatic lobe. Spleen appears normal. There is extensive inflammatory  changes involving the body and tail of pancreas with a large amount of  high-density ascites fluid in the left upper quadrant and surrounding  the pancreas, greater curvature of the stomach, gastrohepatic ligament  and the paracolic gutter extending to the root of the mesentery. The  adrenals the kidneys are normal. Small amount of high-density ascites  is also present surrounding the liver. Pelvis. Bladder is unremarkable. High density ascites present in the  pelvis. The colon is collapsed with wall thickening and  diverticulosis. Appendix is normal.   Impression:     Significant inflammatory changes along the pancreas predominantly the  body and tail with a high density fluid surrounding the pancreas and  left upper quadrant extending to the mesentery and the pelvis  concerning for acute pancreatitis with  possibly hemorrhagic ascites. Correlation with the pancreatic enzymes and surveillance recommended. ALERT:  THIS IS AN ABNORMAL REPORT     Lipase [[id number]] Collected: 05/01/19 1636   Updated: 05/01/19 1723    Specimen Type: Blood     Lipase 20 U/L   Comprehensive Metabolic Panel [[id number]] (Abnormal) Collected: 05/01/19 1636   Updated: 05/01/19 1723    Specimen Type: Blood     Sodium 141 mmol/L    Potassium 4.4 mmol/L    Chloride 103 mmol/L    CO2 25 mmol/L    Anion Gap 13 mmol/L    Glucose 136High  mg/dL    BUN 13 mg/dL    CREATININE 1.1 mg/dL    GFR Non-African American >60 mL/min/1.73    Comment: Chronic Kidney Disease: less than 60 ml/min/1.73 sq. m.            Kidney Failure: less than 15 ml/min/1.73 sq.m.   Results valid for patients 18 years and older. GFR  >60    Calcium 9.2 mg/dL    Total Protein 6.4 g/dL    Alb 4.1 g/dL    Total Bilirubin 0.4 mg/dL    Alkaline Phosphatase 44 U/L    ALT 21 U/L    AST 17 U/L   Lactic Acid, Plasma [[id number]] Collected: 05/01/19 1636   Updated: 05/01/19 1721    Specimen Type: Blood     Lactic Acid 2.0 mmol/L   CBC Auto Differential [[id number]] (Abnormal) Collected: 05/01/19 1636   Updated: 05/01/19 1653    Specimen Source: Blood     WBC 13. 6High  E9/L    RBC 5.49 E12/L    Hemoglobin 17. 1High  g/dL    Hematocrit 50.1 %    MCV 91.3 fL    MCH 31.1 pg    MCHC 34.1 %    RDW 13.2 fL    Platelets 654 J6/B    MPV 10.6 fL    Neutrophils % 83. 9High  %    Immature Granulocytes % 0.5 %    Lymphocytes % 9.6Low  %    Monocytes % 5.4 %    Eosinophils % 0.1 %    Basophils % 0.5 %    Neutrophils # 11. 41High  E9/L    Immature Granulocytes # 0.07 E9/L    Lymphocytes # 1. 30Low  E9/L    Monocytes # 0.74 E9/L    Eosinophils # 0. 02Low  E9/L    Basophils # 0.07 E9/L         Problem list:    Patient Active Problem List   Diagnosis    Acute pancreatitis    Idiopathic acute pancreatitis         ASSESSMENT:      1. Abdominal pain sudden onset, etiology unclear    2. Radiographic finding of pancreatitis not supported by chemistries or clinical presentation    PLAN:     1. Continue IV hydration and analgesia    2. Obtain CTA of abdomen     3.  Discussed with surgery attending, condition serious, prognosis guarded    Moris East D.O., [facility]  9:42 AM  5/2/2019

## 2019-05-02 NOTE — CONSULTS
Surgery Consult    Patient's Name/Date of Birth: Any Ambrosio / 1960, 64 y.o. yo    Date: May 2, 2019     PCP: Yuli Zambrano MD     Reason for Consult: abdominal pain      History of Present Illness: 64year old healthy male. Presented to ED with acute onset of abdominal pain. Pain in both shoulders as well. The day before at work he was lifting something awkward and sneezed. He experienced some sharp abdominal pain in the LUQ. It never really improved, and suddenly worsened yesterday evening. He came to the ED. History reviewed. No pertinent past medical history. History reviewed. No pertinent surgical history. History reviewed. No pertinent family history. Allergies: Patient has no known allergies.      Current Facility-Administered Medications   Medication Dose Route Frequency Provider Last Rate Last Dose    lactated ringers infusion   Intravenous Continuous Victorina Loupe,  mL/hr at 05/02/19 0749      pantoprazole (PROTONIX) injection 40 mg  40 mg Intravenous Daily Victorina Loupe, DO   40 mg at 05/02/19 0749    And    sodium chloride (PF) 0.9 % injection 10 mL  10 mL Intravenous Daily Victorina Loupe, DO   10 mL at 05/02/19 0748    sodium chloride flush 0.9 % injection 10 mL  10 mL Intravenous BID Thea Perez II, MD        sodium chloride flush 0.9 % injection 10 mL  10 mL Intravenous PRN Jad Soni MD        potassium chloride (KLOR-CON M) extended release tablet 40 mEq  40 mEq Oral PRN Jad Soni MD        Or    potassium bicarb-citric acid (EFFER-K) effervescent tablet 40 mEq  40 mEq Oral PRN Jad Soni MD        Or    potassium chloride 10 mEq/100 mL IVPB (Peripheral Line)  10 mEq Intravenous PRN Jad Soni MD        magnesium hydroxide (MILK OF MAGNESIA) 400 MG/5ML suspension 30 mL  30 mL Oral Daily PRN Jad Soni MD        [facility]etron [facility]) injection 4 mg  4 mg Intravenous Q6H PRN Jad Soni MD        morphine (PF) injection 2 mg  2 mg Intravenous Q3H PRN Mt Gatica MD   2 mg at 05/02/19 8627       Social History     Tobacco Use    Smoking status: Heavy Tobacco Smoker     Packs/day: 0.50     Years: 40.00     Pack years: 20.00     Types: Cigarettes   Substance Use Topics    Alcohol use: Yes     Comment: social        Review of Systems:    Other than stated above in the HPI is negative      Physical exam:     Patient Vitals for the past 24 hrs:   BP Temp Temp src Pulse Resp SpO2 Height Weight   05/02/19 0730 116/70 98.8 °F (37.1 °C) Oral 67 16 96 % -- --   05/02/19 0433 -- -- -- -- -- -- -- 186 lb 4.8 oz (84.5 kg)   05/02/19 0100 108/62 98.2 °F (36.8 °C) Oral 65 18 95 % -- --   05/01/19 2115 129/81 97.8 °F (36.6 °C) Oral 71 18 95 % 5' 11\" (1.803 m) 186 lb 4.8 oz (84.5 kg)   05/01/19 2022 121/76 98 °F (36.7 °C) Oral 66 16 98 % -- --   05/01/19 1826 132/81 -- -- 65 16 100 % -- --   05/01/19 1731 120/79 -- -- 61 18 100 % -- --   05/01/19 1619 126/87 97.4 °F (36.3 °C) Oral 66 20 100 % 5' 11\" (1.803 m) 180 lb (81.6 kg)       General appearance: no acute distress  Head: NCAT, PERRLA, EOMI  Neck: supple, no masses  Lungs: CTABL  Heart: RRR  Abdomen: soft, mildly distended, mild diffuse tenderness without guarding  Extremities: no rash cyanosis edema or jaundice    Labs:    Recent Labs     05/01/19  1636 05/01/19  2356 05/02/19  0345 05/02/19  1237   WBC 13.6*  --  9.8 10.2   HGB 17.1* 13.7 13.3 14.6   HCT 50.1 40.9 40.5 44.7     --  160 165     Recent Labs     05/01/19  1636 05/02/19  0345   CREATININE 1.1 0.9   BUN 13 13    141   K 4.4 4.0    108*   CO2 25 24     Recent Labs     05/01/19  1636 05/02/19  0345   AST 17 12   ALT 21 15   BILITOT 0.4 0.4   ALKPHOS 44 35*     Recent Labs     05/01/19  1636   LIPASE 20     No results for input(s): LACTATE in the last 72 hours. No results for input(s): INR, PTT in the last 72 hours.     Invalid input(s): PT    Films:  Ct Abdomen Pelvis W Iv Contrast Additional Contrast? None    Result Date: 2019  Patient MRN:  [id number] : 1960 Age: 64 years Gender: Male Order Date:  2019 5:00 PM EXAM: CT ABDOMEN PELVIS W IV CONTRAST number of images 392. Contrast. Isovue-370, 110 mL intravenously Technique: Low-dose CT  acquisition technique included one of following options; 1 . Automated exposure control, 2. Adjustment of MA and or KV according to patient's size or 3. Use of iterative reconstruction. INDICATION:  abdominal pain  COMPARISON: None FINDINGS: The lung bases demonstrate no significant abnormalities. The liver is of normal architecture except for a 1.4 cm cystic lesion in the left hepatic lobe. Spleen appears normal. There is extensive inflammatory changes involving the body and tail of pancreas with a large amount of high-density ascites fluid in the left upper quadrant and surrounding the pancreas, greater curvature of the stomach, gastrohepatic ligament and the paracolic gutter extending to the root of the mesentery. The adrenals the kidneys are normal. Small amount of high-density ascites is also present surrounding the liver. Pelvis. Bladder is unremarkable. High density ascites present in the pelvis. The colon is collapsed with wall thickening and diverticulosis. Appendix is normal.     Significant inflammatory changes along the pancreas predominantly the body and tail with a high density fluid surrounding the pancreas and left upper quadrant extending to the mesentery and the pelvis concerning for acute pancreatitis with  possibly hemorrhagic ascites. Correlation with the pancreatic enzymes and surveillance recommended.  ALERT:  THIS IS AN ABNORMAL REPORT     Cta Abdomen Pelvis W Contrast    Result Date: 2019  Patient MRN:  [id number] : 1960 Age: 64 years Gender: Male Order Date:  2019 7:45 AM EXAM: CTA ABDOMEN PELVIS W CONTRAST Dosage: 1575.7 mGY-cm Contrast: 100 mL Isovue-370 INDICATION:  r/o rupture visceral artery aneurysm  COMPARISON: 2019 FINDINGS:  There is bibasilar atelectasis worse in the right lower lobe and small bilateral pleural effusions. There is a small to moderate amount of upper abdominal ascites. There is a considerable amount of higher attenuation fluid and infiltration around the stomach, lesser sac and peripancreatic region and dissecting into left lower quadrant. This would be compatible with hemorrhagic pancreatitis and similar to the prior study. There is some calcified right hilar nodes. Celiac axis and SMA appear normal. Splenic artery appears normal. No definite evidence of extravasation or pseudoaneurysm is identified. Single left and 2 right renal arteries appear normal. No pancreatic mass is noted. . Kidneys are unremarkable. There is a significant amount of pelvic ascites. There is some left-sided colonic diverticula without diverticulitis. Considerable amount of increased density in the peripancreatic and perigastric region and lesser sac compatible with hemorrhagic pancreatitis. Similar to the prior study. There is a moderate amount of abdominal and pelvic ascites No pseudoaneurysm or focal area of extravasation from mesenteric vessels are noted. Assessment:   Hemoperitoneum  My first concern was potential ruptured visceral aneurysm. CTA completed- no obvious source  Having spoken to him about this sneezing incident, I suspect he tore a superficial vessel in his gastrocolic ligament or some other superficial vessel      Plan:  Because of the unusual nature of this problem, would like to get an opinion from vascular  I don't think he needs an arteriogram urgently, or at all.   He has been completely stable and his initial drop in Hb has stabilized  Ok for diet  Likely home tomorrow provided stable vitals, tolerating PO, stable Hb        Jo Matamoros MD 5/2/2019 at 3:37 PM

## 2019-05-02 NOTE — CONSULTS
Vascular Surgery Consultation Note    Reason for Consult:  Evaluation for visceral aneurysm contribution to hemoperitoneum    HPI:    This is a 64 y.o. male who is admitted to the hospital for treatment of acute abdominal pain now improved found to have hemorrhagic ascites on CT. He reports acute epigastric pain beginning yesterday afternoon which began reportedly after a sneezing spell while awkwardly positioned over a table yesterday. Pain subsequently worsened. No associated vomiting or changes to bm. No history of reflux. No history of pancreatitis. No contributing medical or vascular history. He denies recent travel, new foods or medications. +Social EtOH. Vascular surgery is consulted for evaluation and treatment of possible visceral artery contribution to hemoperitoneum. ROS: Negative if blank [], Positive if [x]  General Vascular   [] Fevers [] Claudication (Blocks)   [] Chills [] Rest Pain   [] Weight Loss [] Tissue Loss   [] Chest Pain [] Clotting Disorder   [] SOB at rest [] Leg Swelling   [] SOB with exertion [] DVT/PE      [] Nausea    [] Vomiting [] Stroke/TIA   [x] Abdominal Pain [] Focal weakness   [] Melena [] Slurred Speech   [] Hematochezia [] Vision Changes   [] Hematuria    [] Dysuria [] Hx of Central Catheters   [] Wears Glasses/Contacts [] Dialysis and If so date initiated   [] Blindness    [x] Right Hand Dominant   [] Difficulty swallowing        History reviewed. No pertinent past medical history. History reviewed. No pertinent surgical history.     Current Medications:    lactated ringers 125 mL/hr at 05/02/19 0749      sodium chloride flush, potassium chloride **OR** potassium alternative oral replacement **OR** potassium chloride, magnesium hydroxide, ondansetron, morphine    pantoprazole  40 mg Intravenous Daily    And    sodium chloride (PF)  10 mL Intravenous Daily    sodium chloride flush  10 mL Intravenous BID        Allergies:  Patient has no known allergies. Social History     Socioeconomic History    Marital status:      Spouse name: Not on file    Number of children: Not on file    Years of education: Not on file    Highest education level: Not on file   Occupational History    Not on file   Social Needs    Financial resource strain: Not on file    Food insecurity:     Worry: Not on file     Inability: Not on file    Transportation needs:     Medical: Not on file     Non-medical: Not on file   Tobacco Use    Smoking status: Heavy Tobacco Smoker     Packs/day: 0.50     Years: 40.00     Pack years: 20.00     Types: Cigarettes   Substance and Sexual Activity    Alcohol use: Yes     Comment: social    Drug use: No    Sexual activity: Not on file   Lifestyle    Physical activity:     Days per week: Not on file     Minutes per session: Not on file    Stress: Not on file   Relationships    Social connections:     Talks on phone: Not on file     Gets together: Not on file     Attends Denominational service: Not on file     Active member of club or organization: Not on file     Attends meetings of clubs or organizations: Not on file     Relationship status: Not on file    Intimate partner violence:     Fear of current or ex partner: Not on file     Emotionally abused: Not on file     Physically abused: Not on file     Forced sexual activity: Not on file   Other Topics Concern    Not on file   Social History Narrative    Not on file        History reviewed. No pertinent family history.     PHYSICAL EXAM:    /70   Pulse 67   Temp 98.8 °F (37.1 °C) (Oral)   Resp 16   Ht 5' 11\" (1.803 m)   Wt 186 lb 4.8 oz (84.5 kg)   SpO2 96%   BMI 25.98 kg/m²   CONSTITUTIONAL:  awake, alert, cooperative, no apparent distress, and appears stated age  NEURO:  Normal  EYES:  lids and lashes normal, sclera clear and conjunctiva normal  ENT:  normocepalic, without obvious abnormality  NECK:  supple, symmetrical, trachea midline  LUNGS:  no increased work of breathing  CARDIOVASCULAR:  RR  ABDOMEN:  soft, non-distended, minimal epigastric tenderness, Aorta is not palpable  SKIN:  no bruising or bleeding  EXT: warm, no weakness, no wounds, no edema    R radial 2 L radial 2   R femoral 2 L femoral 2   R posterior tibial 2 L posterior tibial 2   R dorsalis pedis 2 L dorsalis pedis 2       LABS:    Lab Results   Component Value Date    WBC 10.2 2019    HGB 14.6 2019    HCT 44.7 2019     2019    K 4.0 2019    BUN 13 2019    CREATININE 0.9 2019       RADIOLOGY:  Ct Abdomen Pelvis W Iv Contrast Additional Contrast? None    Result Date: 2019  Patient MRN:  [id number] : 1960 Age: 64 years Gender: Male Order Date:  2019 5:00 PM EXAM: CT ABDOMEN PELVIS W IV CONTRAST number of images 392. Contrast. Isovue-370, 110 mL intravenously Technique: Low-dose CT  acquisition technique included one of following options; 1 . Automated exposure control, 2. Adjustment of MA and or KV according to patient's size or 3. Use of iterative reconstruction. INDICATION:  abdominal pain  COMPARISON: None FINDINGS: The lung bases demonstrate no significant abnormalities. The liver is of normal architecture except for a 1.4 cm cystic lesion in the left hepatic lobe. Spleen appears normal. There is extensive inflammatory changes involving the body and tail of pancreas with a large amount of high-density ascites fluid in the left upper quadrant and surrounding the pancreas, greater curvature of the stomach, gastrohepatic ligament and the paracolic gutter extending to the root of the mesentery. The adrenals the kidneys are normal. Small amount of high-density ascites is also present surrounding the liver. Pelvis. Bladder is unremarkable. High density ascites present in the pelvis. The colon is collapsed with wall thickening and diverticulosis.  Appendix is normal.     Significant inflammatory changes along the pancreas predominantly the body and tail with a high density fluid surrounding the pancreas and left upper quadrant extending to the mesentery and the pelvis concerning for acute pancreatitis with  possibly hemorrhagic ascites. Correlation with the pancreatic enzymes and surveillance recommended. ALERT:  THIS IS AN ABNORMAL REPORT     Cta Abdomen Pelvis W Contrast    Result Date: 2019  Patient MRN:  [id number] : 1960 Age: 64 years Gender: Male Order Date:  2019 7:45 AM EXAM: CTA ABDOMEN PELVIS W CONTRAST Dosage: 1575.7 mGY-cm Contrast: 100 mL Isovue-370 INDICATION:  r/o rupture visceral artery aneurysm  COMPARISON: 2019 FINDINGS:  There is bibasilar atelectasis worse in the right lower lobe and small bilateral pleural effusions. There is a small to moderate amount of upper abdominal ascites. There is a considerable amount of higher attenuation fluid and infiltration around the stomach, lesser sac and peripancreatic region and dissecting into left lower quadrant. This would be compatible with hemorrhagic pancreatitis and similar to the prior study. There is some calcified right hilar nodes. Celiac axis and SMA appear normal. Splenic artery appears normal. No definite evidence of extravasation or pseudoaneurysm is identified. Single left and 2 right renal arteries appear normal. No pancreatic mass is noted. . Kidneys are unremarkable. There is a significant amount of pelvic ascites. There is some left-sided colonic diverticula without diverticulitis. Considerable amount of increased density in the peripancreatic and perigastric region and lesser sac compatible with hemorrhagic pancreatitis. Similar to the prior study. There is a moderate amount of abdominal and pelvic ascites No pseudoaneurysm or focal area of extravasation from mesenteric vessels are noted. Assesment/Plan  64 y.o. male with hemoperitoneum of uncertain etiology possible small visceral or omental bleeding due to valsalva.  Ruptured aneurysm less likely    Monitor HH  If rebleeding were to occur would recommend IR evaluation and transfer to [facility] to review imaging studies    Trina DO Bradley  5/2/19  3:21 PM

## 2019-05-02 NOTE — CARE COORDINATION
5/2/2019  Social Work Discharge Planning:  Pt states no needs at discharge. Electronically signed by MARAL Little on 5/2/2019 at 12:25 PM

## 2019-05-03 VITALS
RESPIRATION RATE: 16 BRPM | HEART RATE: 75 BPM | OXYGEN SATURATION: 99 % | DIASTOLIC BLOOD PRESSURE: 75 MMHG | WEIGHT: 184.6 LBS | TEMPERATURE: 98.4 F | SYSTOLIC BLOOD PRESSURE: 119 MMHG | HEIGHT: 71 IN | BODY MASS INDEX: 25.84 KG/M2

## 2019-05-03 LAB
ALBUMIN SERPL-MCNC: 3.4 G/DL (ref 3.5–5.2)
ALP BLD-CCNC: 39 U/L (ref 40–129)
ALT SERPL-CCNC: 12 U/L (ref 0–40)
ANION GAP SERPL CALCULATED.3IONS-SCNC: 9 MMOL/L (ref 7–16)
AST SERPL-CCNC: 11 U/L (ref 0–39)
BILIRUB SERPL-MCNC: 0.4 MG/DL (ref 0–1.2)
BUN BLDV-MCNC: 14 MG/DL (ref 6–20)
CALCIUM SERPL-MCNC: 8.1 MG/DL (ref 8.6–10.2)
CHLORIDE BLD-SCNC: 101 MMOL/L (ref 98–107)
CHOLESTEROL, TOTAL: 111 MG/DL (ref 0–199)
CO2: 24 MMOL/L (ref 22–29)
CREAT SERPL-MCNC: 0.9 MG/DL (ref 0.7–1.2)
GFR AFRICAN AMERICAN: >60
GFR NON-AFRICAN AMERICAN: >60 ML/MIN/1.73
GLUCOSE BLD-MCNC: 109 MG/DL (ref 74–99)
HCT VFR BLD CALC: 35.6 % (ref 37–54)
HCT VFR BLD CALC: 39 % (ref 37–54)
HDLC SERPL-MCNC: 31 MG/DL
HEMOGLOBIN: 12 G/DL (ref 12.5–16.5)
HEMOGLOBIN: 12.9 G/DL (ref 12.5–16.5)
LDL CHOLESTEROL CALCULATED: 57 MG/DL (ref 0–99)
MCH RBC QN AUTO: 30.6 PG (ref 26–35)
MCH RBC QN AUTO: 31.6 PG (ref 26–35)
MCHC RBC AUTO-ENTMCNC: 33.1 % (ref 32–34.5)
MCHC RBC AUTO-ENTMCNC: 33.7 % (ref 32–34.5)
MCV RBC AUTO: 92.4 FL (ref 80–99.9)
MCV RBC AUTO: 93.7 FL (ref 80–99.9)
PDW BLD-RTO: 13.4 FL (ref 11.5–15)
PDW BLD-RTO: 13.6 FL (ref 11.5–15)
PLATELET # BLD: 127 E9/L (ref 130–450)
PLATELET # BLD: 146 E9/L (ref 130–450)
PMV BLD AUTO: 10.3 FL (ref 7–12)
PMV BLD AUTO: 10.8 FL (ref 7–12)
POTASSIUM SERPL-SCNC: 3.9 MMOL/L (ref 3.5–5)
RBC # BLD: 3.8 E12/L (ref 3.8–5.8)
RBC # BLD: 4.22 E12/L (ref 3.8–5.8)
SODIUM BLD-SCNC: 134 MMOL/L (ref 132–146)
TOTAL PROTEIN: 5.6 G/DL (ref 6.4–8.3)
TRIGL SERPL-MCNC: 116 MG/DL (ref 0–149)
VLDLC SERPL CALC-MCNC: 23 MG/DL
WBC # BLD: 7.5 E9/L (ref 4.5–11.5)
WBC # BLD: 8.2 E9/L (ref 4.5–11.5)

## 2019-05-03 PROCEDURE — 80061 LIPID PANEL: CPT

## 2019-05-03 PROCEDURE — 36415 COLL VENOUS BLD VENIPUNCTURE: CPT

## 2019-05-03 PROCEDURE — 80053 COMPREHEN METABOLIC PANEL: CPT

## 2019-05-03 PROCEDURE — 99244 OFF/OP CNSLTJ NEW/EST MOD 40: CPT | Performed by: SURGERY

## 2019-05-03 PROCEDURE — G0378 HOSPITAL OBSERVATION PER HR: HCPCS

## 2019-05-03 PROCEDURE — 85027 COMPLETE CBC AUTOMATED: CPT

## 2019-05-03 RX ORDER — HYDROCODONE BITARTRATE AND ACETAMINOPHEN 5; 325 MG/1; MG/1
1 TABLET ORAL EVERY 4 HOURS PRN
Qty: 8 TABLET | Refills: 0 | Status: SHIPPED | OUTPATIENT
Start: 2019-05-03 | End: 2019-05-06

## 2019-05-03 NOTE — PROGRESS NOTES
[facility] Quality Flow/Interdisciplinary Rounds Progress Note        Quality Flow Rounds held on May 2, 2019    Disciplines Attending:  Bedside Nurse, ,  and Nursing Unit [address] [facility] was admitted on 5/1/2019  4:08 PM    Anticipated Discharge Date:  Expected Discharge Date: 05/06/19    Disposition:    Adalid Score:  Adalid Scale Score: 21    Readmission Risk              Risk of Unplanned Readmission:        7           Discussed patient goal for the day, patient clinical progression, and barriers to discharge. The following Goal(s) of the Day/Commitment(s) have been identified:  Check Surgical plan.       Dylon Dorado  May 2, 2019
GENERAL SURGERY  DAILY PROGRESS NOTE  5/3/2019    Subjective:  No acute events  Pain controlled  Tolerating diet  Denied n/v    Objective:  BP (!) 91/37   Pulse 85   Temp 98.4 °F (36.9 °C) (Oral)   Resp 16   Ht 5' 11\" (1.803 m)   Wt 184 lb 9.6 oz (83.7 kg)   SpO2 95%   BMI 25.75 kg/m²     General: NAD, awake and alert. Head: Normocephalic, atraumatic  Eyes: PERRLA, EOMI. Lungs: No increased work of breathing. Cardiovascular: RRR. Abdomen: Soft, ND, mild TTP epigastrium. No rebound, guarding or rigidity. Extremities: Atraumatic, full range of motion  Skin: Warm, dry and intact    Assessment/Plan:  64 y.o. male with hemoperitoneum possibly secondary to Valsalva from coughing or recent abdominal trauma    No acute surgical intervention  Pain and nausea control PRN  Diet as tolerated  Saline lock IVF's  Monitor Hgb. Stable. Vascular consulted. OOB/AAT  Discharge planning if Hgb remains stable.      Electronically signed by Will Bermeo MD on 5/3/2019 at 7:03 AM
Physical Therapy    Facility/Department: [facility] MED SURG  Initial Assessment    NAME: Yomaira Guardado  : 1960  MRN: [id number]    Date of Service: 2019        Patient Diagnosis(es): The encounter diagnosis was Acute hemorrhagic pancreatitis. Evaluating Therapist: Zhane Ozuna PT      Room #: 9666/8952-I  DIAGNOSIS: acute hemorrhagic pancreatitits    PRECAUTIONS: none    Social:  Pt lives with wife in a 2 floor plan 2/3 steps and 1 rails to enter. Bed/bath on second floor. Prior to admission pt walked with no device. Pt independent with all functional mobility. Initial Evaluation  Date:    Was pt agreeable to Eval/treatment? yes   Does pt have pain? Abdominal pain but pt reported not as bad as yesterday. Bed Mobility  Rolling: independent  Supine to sit: independent  Sit to supine: independent  Scooting: independent   Transfers Sit to stand: independent  Stand to sit: independent  Stand pivot: independent   Ambulation    15 feet x 2 with no device independent. Stair negotiation: ascended and descended NT    ROM [facility]   Strength Grossly 4/5   AM-PAC raw score 24/24     Pt is alert and Oriented x3  Balance: sitting and standing independent  Sensation: intact  Endurance: fair    Chair alarm: no     ASSESSMENT  Pt displays functional ability as noted in the objective portion of this evaluation. Comments/Treatment:  Pt found in bed with wife present. Pt's wife requested pt's activity be limited due to possible trauma and further testing was going to be completed. No report of dizziness during functional mobility. No LOB during ambulation. Instructed pt on log roll technique for bed mobility. At end of eval, pt left supine in bed with call light in reach and wife present. Pt will not be picked up for PT services at this time due to independent functional mobility level.       Examination of body systems Decreased   Functional mobility    ROM    Strength    Safety Awareness
Spoke with Dr. Dylon Feliz regarding plan of care-will flip to inpatient.   Electronically signed by Yonatan Delatorre RN on 5/2/2019 at 12:10 PM
bathroom - ambulated to bathroom only and returned to bed. Patient stating he felt much better than yesterday and having no pain. No futher question/concerns. Upon arrival, patient lying in bed . At end of session, patient returned to bed  with call light and phone within reach, all lines and tubes intact. Eval Complexity: Low       Patient / Family Goal: return home       Patient and/or family were instructed on functional diagnosis, prognosis/goals and OT plan of care. Demonstrated good  understanding.     Low Evaluation     Evaluation time includes thorough review of current medical information, gathering information on past medical history/social history and prior level of function, completion of standardized testing/informal observation of tasks, assessment of data, and development of POC/Goals      Lois Taylor OTR/L [id number]

## 2019-05-03 NOTE — DISCHARGE SUMMARY
Physician Discharge Summary     Patient ID:  Zack Pat  [id number]  66 y.o.  1960    Admit date: 2019    Discharge date and time: 5/3/2019  5:16 PM     Admission Diagnoses: Idiopathic acute pancreatitis [K85.00]  Idiopathic acute pancreatitis [K85.00]    Discharge Diagnoses:        1. Abdominal painand  hemoperitoneum possibly secondary to Valsalva from coughing or recent abdominal trauma     2. Radiographic finding of pancreatitis not supported by chemistries or clinical presentation    Consults: general surgery and vascular surgery    Procedures:   Narrative   Patient MRN:  [id number]   : 1960   Age: 64 years   Gender: Male       Order Date:  2019 7:45 AM       EXAM: CTA ABDOMEN PELVIS W CONTRAST   Dosage: 1575.7 mGY-cm       Contrast: 100 mL Isovue-370       INDICATION:  r/o rupture visceral artery aneurysm         COMPARISON: 2019       FINDINGS:     There is bibasilar atelectasis worse in the right lower lobe and small   bilateral pleural effusions.       There is a small to moderate amount of upper abdominal ascites. There   is a considerable amount of higher attenuation fluid and infiltration   around the stomach, lesser sac and peripancreatic region and   dissecting into left lower quadrant. This would be compatible with   hemorrhagic pancreatitis and similar to the prior study.       There is some calcified right hilar nodes. Celiac axis and SMA appear   normal. Splenic artery appears normal. No definite evidence of   extravasation or pseudoaneurysm is identified. Single left and 2 right   renal arteries appear normal.       No pancreatic mass is noted. . Kidneys are unremarkable.       There is a significant amount of pelvic ascites. There is some   left-sided colonic diverticula without diverticulitis.           Impression   Considerable amount of increased density in the peripancreatic and   perigastric region and lesser sac compatible with hemorrhagic   pancreatitis.  Similar Details   HYDROcodone-acetaminophen (NORCO) 5-325 MG per tablet Take 1 tablet by mouth every 4 hours as needed for Pain for up to 3 days. Intended supply: 3 days. Take lowest dose possible to manage pain  Qty: 8 tablet, Refills: 0    Comments: Reduce doses taken as pain becomes manageable  Associated Diagnoses: Acute hemorrhagic pancreatitis           Activity: activity as tolerated  Diet: regular diet    Follow-up with Dr Socorro Chen in 1 week. Note that over 30 minutes was spent in preparing discharge papers, discussing discharge with patient, medication review, etc.    Signed:  LUPE Greenfield  5/3/2019  2:01 PM

## 2019-05-03 NOTE — PLAN OF CARE
Problem: Pain:  Goal: Pain level will decrease  Description  Pain level will decrease  Outcome: Met This Shift     Problem: Pain:  Goal: Control of acute pain  Description  Control of acute pain  Outcome: Met This Shift     Problem: Fluid Volume:  Goal: Will maintain adequate fluid volume  Description  Will maintain adequate fluid volume  Outcome: Met This Shift     Problem: Physical Regulation:  Goal: Hemodynamic stability will improve  Description  Hemodynamic stability will improve  Outcome: Met This Shift     Problem: Sensory:  Goal: Pain level will decrease  Description  Pain level will decrease  Outcome: Met This Shift